# Patient Record
Sex: FEMALE | Race: BLACK OR AFRICAN AMERICAN | NOT HISPANIC OR LATINO | Employment: UNEMPLOYED | URBAN - METROPOLITAN AREA
[De-identification: names, ages, dates, MRNs, and addresses within clinical notes are randomized per-mention and may not be internally consistent; named-entity substitution may affect disease eponyms.]

---

## 2023-08-16 ENCOUNTER — OFFICE VISIT (OUTPATIENT)
Dept: PRIMARY CARE | Facility: CLINIC | Age: 43
End: 2023-08-16
Payer: COMMERCIAL

## 2023-08-16 VITALS
WEIGHT: 207.6 LBS | SYSTOLIC BLOOD PRESSURE: 111 MMHG | HEART RATE: 67 BPM | HEIGHT: 67 IN | TEMPERATURE: 97.8 F | OXYGEN SATURATION: 98 % | DIASTOLIC BLOOD PRESSURE: 73 MMHG | BODY MASS INDEX: 32.58 KG/M2

## 2023-08-16 DIAGNOSIS — Z00.00 HEALTH CARE MAINTENANCE: Primary | ICD-10-CM

## 2023-08-16 PROCEDURE — 1036F TOBACCO NON-USER: CPT | Performed by: FAMILY MEDICINE

## 2023-08-16 PROCEDURE — 99396 PREV VISIT EST AGE 40-64: CPT | Performed by: FAMILY MEDICINE

## 2023-08-16 PROCEDURE — 88175 CYTOPATH C/V AUTO FLUID REDO: CPT

## 2023-08-16 PROCEDURE — 87624 HPV HI-RISK TYP POOLED RSLT: CPT

## 2023-08-16 ASSESSMENT — PAIN SCALES - GENERAL: PAINLEVEL: 0-NO PAIN

## 2023-08-16 NOTE — PROGRESS NOTES
Subjective   Siobhan Adler is a 43 y.o. female and is here for a comprehensive physical exam. The patient reports no problems.    Do you take any herbs or supplements that were not prescribed by a doctor? not asked  Are you taking calcium supplements? no  Are you taking aspirin daily? no      History:  LMP: No LMP recorded. Has regular menses.    Last pap date: > 5 years ago  Abnormal pap? no  : 8  Para: 8    Do you have pain that bothers you in your daily life? no  Review of Systems     Objective   Physical Exam  Constitutional:       Appearance: She is obese.   HENT:      Head: Normocephalic.      Nose: Nose normal.      Mouth/Throat:      Pharynx: Oropharynx is clear.   Eyes:      Pupils: Pupils are equal, round, and reactive to light.   Cardiovascular:      Rate and Rhythm: Normal rate and regular rhythm.   Pulmonary:      Effort: Pulmonary effort is normal.      Breath sounds: Normal breath sounds.   Abdominal:      General: Abdomen is flat.      Tenderness: There is no abdominal tenderness.   Genitourinary:     Exam position: Lithotomy position.      Pubic Area: No rash.       Labia:         Right: No rash.       Vagina: No vaginal discharge.      Comments: Normal appearing cervix. No masses palpated on bimanual examination.  Musculoskeletal:      Cervical back: Normal range of motion.         Assessment/Plan   Healthy female exam. PAP smear, routine cultures done. She has concerns about STDs, but no discharge, itching, pelvic pain or other symptoms.     1. Counseled about diet including having breakfast daily, avoiding caloric beverages, not eating after 7PM and daily exercise.   2. Patient Counseling:  --Nutrition: Stressed importance of moderation in sodium/caffeine intake, saturated fat and cholesterol, caloric balance, sufficient intake of fresh fruits, vegetables, fiber, calcium, iron, and 1 mg of folate supplement per day (for females capable of pregnancy).  --Discussed the issue of estrogen  replacement, calcium supplement, and the daily use of baby aspirin.  --Exercise: Stressed the importance of regular exercise.   --Substance Abuse: Discussed cessation/primary prevention of tobacco, alcohol, or other drug use; driving or other dangerous activities under the influence; availability of treatment for abuse.    --Sexuality: Discussed sexually transmitted diseases, partner selection, use of condoms, avoidance of unintended pregnancy  and contraceptive alternatives.   --Injury prevention: Discussed safety belts, safety helmets, smoke detector, smoking near bedding or upholstery.   --Dental health: Discussed importance of regular tooth brushing, flossing, and dental visits.  --Immunizations reviewed.  --Discussed benefits of screening colonoscopy.  --After hours service discussed with patient  3. Discussed the patient's BMI with her.  The BMI is above average. The patient received Provided instructions on dietary changes because they have an above normal BMI.  4. Follow up as needed for acute illness; Plus dependent upon PAP results.

## 2023-08-18 LAB
CHLAMYDIA TRACH., AMPLIFIED: NEGATIVE
N. GONORRHEA, AMPLIFIED: NEGATIVE
TRICHOMONAS VAGINALIS: NORMAL

## 2023-08-23 LAB
COMPLETE PATHOLOGY REPORT: NORMAL
CONVERTED CLINICAL DIAGNOSIS-HISTORY: NORMAL
CONVERTED DIAGNOSIS COMMENT: NORMAL
CONVERTED FINAL DIAGNOSIS: NORMAL
CONVERTED FINAL REPORT PDF LINK TO COPY AND PASTE: NORMAL

## 2023-11-15 ENCOUNTER — OFFICE VISIT (OUTPATIENT)
Dept: DERMATOLOGY | Facility: CLINIC | Age: 43
End: 2023-11-15
Payer: COMMERCIAL

## 2023-11-15 DIAGNOSIS — B07.0 PLANTAR WART: Primary | ICD-10-CM

## 2023-11-15 PROCEDURE — 99203 OFFICE O/P NEW LOW 30 MIN: CPT | Performed by: STUDENT IN AN ORGANIZED HEALTH CARE EDUCATION/TRAINING PROGRAM

## 2023-11-15 PROCEDURE — 1036F TOBACCO NON-USER: CPT | Performed by: STUDENT IN AN ORGANIZED HEALTH CARE EDUCATION/TRAINING PROGRAM

## 2023-11-15 ASSESSMENT — ITCH NUMERIC RATING SCALE: HOW SEVERE IS YOUR ITCHING?: 0

## 2023-11-15 NOTE — PROGRESS NOTES
Subjective     Siobhan Adler is a 43 y.o. female who presents for the following: Plantar Warts (Patient was referred from podiatrist. On R foot. Patient had site scraped off at podiatrist and has been using medicated bandages. Painful. ).     Review of Systems:  No other skin or systemic complaints other than what is documented elsewhere in the note.    The following portions of the chart were reviewed this encounter and updated as appropriate:          Skin Cancer History  No skin cancer on file.      Specialty Problems    None       Objective   Well appearing patient in no apparent distress; mood and affect are within normal limits.    A focused skin examination was performed. All findings within normal limits unless otherwise noted below.    Assessment/Plan   1. Plantar wart  Right Plantar Surface of Heel  Verrucous papule interrupting skin tension lines      Reviewed viral etiology and transmissible nature of the condition. Reviewed treatment options including LN2 cryotherapy, observation, WartPeel, imiquimod, intralesional therapies, shave removal, and laser treatment.    Patient opted for treatment with LN2 today.       Destr of lesion - Right Plantar Surface of Heel  Complexity: simple    Destruction method: cryotherapy    Informed consent: discussed and consent obtained    Debridement: hyperkeratotic portion removed with sharp debridement    Lesion destroyed using liquid nitrogen: Yes    Cryotherapy cycles:  3  Outcome: patient tolerated procedure well with no complications    Post-procedure details: wound care instructions given        FU 5 weeks

## 2023-12-20 ENCOUNTER — OFFICE VISIT (OUTPATIENT)
Dept: DERMATOLOGY | Facility: CLINIC | Age: 43
End: 2023-12-20
Payer: COMMERCIAL

## 2023-12-20 DIAGNOSIS — B07.8 OTHER VIRAL WARTS: Primary | ICD-10-CM

## 2023-12-20 DIAGNOSIS — B07.0 PLANTAR WART: ICD-10-CM

## 2023-12-20 PROCEDURE — 17110 DESTRUCTION B9 LES UP TO 14: CPT | Performed by: STUDENT IN AN ORGANIZED HEALTH CARE EDUCATION/TRAINING PROGRAM

## 2023-12-20 PROCEDURE — 1036F TOBACCO NON-USER: CPT | Performed by: STUDENT IN AN ORGANIZED HEALTH CARE EDUCATION/TRAINING PROGRAM

## 2023-12-20 ASSESSMENT — DERMATOLOGY QUALITY OF LIFE (QOL) ASSESSMENT
RATE HOW EMOTIONALLY BOTHERED YOU ARE BY YOUR SKIN PROBLEM (FOR EXAMPLE, WORRY, EMBARRASSMENT, FRUSTRATION): 1
RATE HOW BOTHERED YOU ARE BY SYMPTOMS OF YOUR SKIN PROBLEM (EG, ITCHING, STINGING BURNING, HURTING OR SKIN IRRITATION): 1
DATE THE QUALITY-OF-LIFE ASSESSMENT WAS COMPLETED: 66828
RATE HOW BOTHERED YOU ARE BY EFFECTS OF YOUR SKIN PROBLEMS ON YOUR ACTIVITIES (EG, GOING OUT, ACCOMPLISHING WHAT YOU WANT, WORK ACTIVITIES OR YOUR RELATIONSHIPS WITH OTHERS): 0 - NEVER BOTHERED

## 2023-12-20 ASSESSMENT — ITCH NUMERIC RATING SCALE: HOW SEVERE IS YOUR ITCHING?: 0

## 2023-12-20 ASSESSMENT — PATIENT GLOBAL ASSESSMENT (PGA): PATIENT GLOBAL ASSESSMENT: PATIENT GLOBAL ASSESSMENT:  2 - MILD

## 2023-12-20 ASSESSMENT — DERMATOLOGY PATIENT ASSESSMENT
DO YOU HAVE IRREGULAR MENSTRUAL CYCLES: NO
ARE YOU AN ORGAN TRANSPLANT RECIPIENT: NO
HAVE YOU HAD OR DO YOU HAVE VASCULAR DISEASE: NO
DO YOU USE A TANNING BED: NO
ARE YOU TRYING TO GET PREGNANT: NO
ARE YOU ON BIRTH CONTROL: NO
HAVE YOU HAD OR DO YOU HAVE A STAPH INFECTION: NO

## 2023-12-20 NOTE — PROGRESS NOTES
Subjective     Siobhan Adler is a 43 y.o. female who presents for the following: Wart (F/U LN2 right foot).     Review of Systems:  No other skin or systemic complaints other than what is documented elsewhere in the note.    The following portions of the chart were reviewed this encounter and updated as appropriate:          Skin Cancer History  No skin cancer on file.      Specialty Problems    None       Objective   Well appearing patient in no apparent distress; mood and affect are within normal limits.    A focused skin examination was performed. All findings within normal limits unless otherwise noted below.    Assessment/Plan   1. Other viral warts  Right Plantar Surface of Heel  Verrucous papule    Destr of lesion - Right Plantar Surface of Heel  Complexity: simple    Destruction method: cryotherapy    Informed consent: discussed and consent obtained    Debridement: hyperkeratotic portion removed with sharp debridement    Lesion destroyed using liquid nitrogen: Yes    Cryotherapy cycles:  3  Outcome: patient tolerated procedure well with no complications    Post-procedure details: wound care instructions given      2. Plantar wart    Related Procedures  Follow Up In Dermatology - Established Patient

## 2024-02-08 ENCOUNTER — OFFICE VISIT (OUTPATIENT)
Dept: DERMATOLOGY | Facility: CLINIC | Age: 44
End: 2024-02-08
Payer: COMMERCIAL

## 2024-02-08 DIAGNOSIS — B07.8 OTHER VIRAL WARTS: ICD-10-CM

## 2024-02-08 DIAGNOSIS — B07.0 PLANTAR WART: Primary | ICD-10-CM

## 2024-02-08 PROCEDURE — 1036F TOBACCO NON-USER: CPT | Performed by: STUDENT IN AN ORGANIZED HEALTH CARE EDUCATION/TRAINING PROGRAM

## 2024-02-08 PROCEDURE — 17110 DESTRUCTION B9 LES UP TO 14: CPT | Performed by: STUDENT IN AN ORGANIZED HEALTH CARE EDUCATION/TRAINING PROGRAM

## 2024-02-08 NOTE — PROGRESS NOTES
Subjective     Siobhan Adler is a 43 y.o. female who presents for the following: Wart (LV 12/20/23. Right heel slight improvement. Patient still experiencing pain if standing too long.).     Review of Systems:  No other skin or systemic complaints other than what is documented elsewhere in the note.    The following portions of the chart were reviewed this encounter and updated as appropriate:          Skin Cancer History  No skin cancer on file.      Specialty Problems    None       Objective   Well appearing patient in no apparent distress; mood and affect are within normal limits.    A focused skin examination was performed. All findings within normal limits unless otherwise noted below.    Assessment/Plan   1. Plantar wart  Right Plantar Surface of Heel  Verrucous papule interrupting skin tension lines      Destr of lesion - Right Plantar Surface of Heel  Complexity: simple    Destruction method: cryotherapy    Informed consent: discussed and consent obtained    Debridement: hyperkeratotic portion removed with sharp debridement    Lesion destroyed using liquid nitrogen: Yes    Cryotherapy cycles:  3  Outcome: patient tolerated procedure well with no complications    Post-procedure details: wound care instructions given      Related Procedures  Follow Up In Dermatology - Established Patient    2. Other viral warts    Related Procedures  Follow Up In Dermatology - Established Patient

## 2024-03-28 ENCOUNTER — APPOINTMENT (OUTPATIENT)
Dept: DERMATOLOGY | Facility: CLINIC | Age: 44
End: 2024-03-28
Payer: COMMERCIAL

## 2024-07-10 ENCOUNTER — APPOINTMENT (OUTPATIENT)
Dept: DERMATOLOGY | Facility: CLINIC | Age: 44
End: 2024-07-10
Payer: COMMERCIAL

## 2024-07-10 DIAGNOSIS — B07.8 OTHER VIRAL WARTS: Primary | ICD-10-CM

## 2024-07-10 DIAGNOSIS — L85.9 HYPERKERATOSIS: ICD-10-CM

## 2024-07-10 DIAGNOSIS — B07.0 PLANTAR WART: ICD-10-CM

## 2024-07-10 DIAGNOSIS — M72.2 PLANTAR FASCIITIS OF RIGHT FOOT: ICD-10-CM

## 2024-07-10 PROCEDURE — 99213 OFFICE O/P EST LOW 20 MIN: CPT | Performed by: STUDENT IN AN ORGANIZED HEALTH CARE EDUCATION/TRAINING PROGRAM

## 2024-07-10 NOTE — PROGRESS NOTES
Subjective     Siobhan Adler is a 44 y.o. female who presents for the following: Wart (Plantar wart to right heel. LN2 last visit (2/8/24). States she's believes it has resolved, but has pain to that area, unsure if related or not.  ).     Review of Systems:  No other skin or systemic complaints other than what is documented elsewhere in the note.    The following portions of the chart were reviewed this encounter and updated as appropriate:          Skin Cancer History  No skin cancer on file.      Specialty Problems    None       Objective   Well appearing patient in no apparent distress; mood and affect are within normal limits.    A focused skin examination was performed. All findings within normal limits unless otherwise noted below.    Assessment/Plan   1. Other viral warts  Right Plantar Surface of Heel  Clear today, no need for further treatment. May recur    2. Plantar wart    Related Procedures  Follow Up In Dermatology - Established Patient    3. Hyperkeratosis  Scaling and excessive skin of feet  Recommended Amlactin daily    4. Plantar fasciitis of right foot  Right Plantar Surface of Heel  Pain with walking of heel  Discussed diagnosis  Reviewed what can cause flares  Recommend shoe fitting and stretches/exercises for condition  Pain relief with OTC NSAIDS and tylenol prn  Has FU with PCP

## 2024-08-19 ENCOUNTER — APPOINTMENT (OUTPATIENT)
Dept: PRIMARY CARE | Facility: CLINIC | Age: 44
End: 2024-08-19
Payer: COMMERCIAL

## 2024-08-19 ENCOUNTER — LAB (OUTPATIENT)
Dept: LAB | Facility: LAB | Age: 44
End: 2024-08-19
Payer: COMMERCIAL

## 2024-08-19 VITALS
WEIGHT: 205.7 LBS | HEART RATE: 69 BPM | OXYGEN SATURATION: 98 % | TEMPERATURE: 96.9 F | DIASTOLIC BLOOD PRESSURE: 69 MMHG | SYSTOLIC BLOOD PRESSURE: 103 MMHG | HEIGHT: 67 IN | BODY MASS INDEX: 32.28 KG/M2

## 2024-08-19 DIAGNOSIS — Z00.00 HEALTH CARE MAINTENANCE: ICD-10-CM

## 2024-08-19 DIAGNOSIS — R73.9 ELEVATED SERUM GLUCOSE: ICD-10-CM

## 2024-08-19 DIAGNOSIS — Z71.1 CONCERN ABOUT STI IN FEMALE WITHOUT DIAGNOSIS: ICD-10-CM

## 2024-08-19 DIAGNOSIS — Z12.31 ENCOUNTER FOR SCREENING MAMMOGRAM FOR MALIGNANT NEOPLASM OF BREAST: Primary | ICD-10-CM

## 2024-08-19 LAB
ALBUMIN SERPL BCP-MCNC: 4.2 G/DL (ref 3.4–5)
ALP SERPL-CCNC: 63 U/L (ref 33–110)
ALT SERPL W P-5'-P-CCNC: 10 U/L (ref 7–45)
ANION GAP SERPL CALC-SCNC: 11 MMOL/L (ref 10–20)
AST SERPL W P-5'-P-CCNC: 11 U/L (ref 9–39)
BILIRUB SERPL-MCNC: 0.4 MG/DL (ref 0–1.2)
BUN SERPL-MCNC: 13 MG/DL (ref 6–23)
CALCIUM SERPL-MCNC: 9.4 MG/DL (ref 8.6–10.6)
CHLORIDE SERPL-SCNC: 106 MMOL/L (ref 98–107)
CHOLEST SERPL-MCNC: 207 MG/DL (ref 0–199)
CHOLESTEROL/HDL RATIO: 3.5
CO2 SERPL-SCNC: 26 MMOL/L (ref 21–32)
CREAT SERPL-MCNC: 0.67 MG/DL (ref 0.5–1.05)
EGFRCR SERPLBLD CKD-EPI 2021: >90 ML/MIN/1.73M*2
EST. AVERAGE GLUCOSE BLD GHB EST-MCNC: 94 MG/DL
GLUCOSE SERPL-MCNC: 81 MG/DL (ref 74–99)
HBA1C MFR BLD: 4.9 %
HBV SURFACE AG SERPL QL IA: NONREACTIVE
HCV AB SER QL: NONREACTIVE
HDLC SERPL-MCNC: 59.7 MG/DL
HIV 1+2 AB+HIV1 P24 AG SERPL QL IA: NONREACTIVE
LDLC SERPL CALC-MCNC: 136 MG/DL
NON HDL CHOLESTEROL: 147 MG/DL (ref 0–149)
POTASSIUM SERPL-SCNC: 4 MMOL/L (ref 3.5–5.3)
PROT SERPL-MCNC: 7.5 G/DL (ref 6.4–8.2)
SODIUM SERPL-SCNC: 139 MMOL/L (ref 136–145)
TREPONEMA PALLIDUM IGG+IGM AB [PRESENCE] IN SERUM OR PLASMA BY IMMUNOASSAY: NONREACTIVE
TRIGL SERPL-MCNC: 58 MG/DL (ref 0–149)
VLDL: 12 MG/DL (ref 0–40)

## 2024-08-19 PROCEDURE — 87591 N.GONORRHOEAE DNA AMP PROB: CPT

## 2024-08-19 PROCEDURE — 83036 HEMOGLOBIN GLYCOSYLATED A1C: CPT

## 2024-08-19 PROCEDURE — 3008F BODY MASS INDEX DOCD: CPT | Performed by: PODIATRIST

## 2024-08-19 PROCEDURE — 86803 HEPATITIS C AB TEST: CPT

## 2024-08-19 PROCEDURE — 80053 COMPREHEN METABOLIC PANEL: CPT

## 2024-08-19 PROCEDURE — 80061 LIPID PANEL: CPT

## 2024-08-19 PROCEDURE — 99396 PREV VISIT EST AGE 40-64: CPT | Performed by: PODIATRIST

## 2024-08-19 PROCEDURE — 36415 COLL VENOUS BLD VENIPUNCTURE: CPT

## 2024-08-19 PROCEDURE — 87661 TRICHOMONAS VAGINALIS AMPLIF: CPT

## 2024-08-19 PROCEDURE — 86780 TREPONEMA PALLIDUM: CPT

## 2024-08-19 PROCEDURE — 87491 CHLMYD TRACH DNA AMP PROBE: CPT

## 2024-08-19 PROCEDURE — 87340 HEPATITIS B SURFACE AG IA: CPT

## 2024-08-19 PROCEDURE — 87389 HIV-1 AG W/HIV-1&-2 AB AG IA: CPT

## 2024-08-19 ASSESSMENT — PAIN SCALES - GENERAL: PAINLEVEL: 0-NO PAIN

## 2024-08-19 ASSESSMENT — ENCOUNTER SYMPTOMS
DEPRESSION: 0
LOSS OF SENSATION IN FEET: 0
OCCASIONAL FEELINGS OF UNSTEADINESS: 0

## 2024-08-19 ASSESSMENT — COLUMBIA-SUICIDE SEVERITY RATING SCALE - C-SSRS
2. HAVE YOU ACTUALLY HAD ANY THOUGHTS OF KILLING YOURSELF?: NO
6. HAVE YOU EVER DONE ANYTHING, STARTED TO DO ANYTHING, OR PREPARED TO DO ANYTHING TO END YOUR LIFE?: NO
1. IN THE PAST MONTH, HAVE YOU WISHED YOU WERE DEAD OR WISHED YOU COULD GO TO SLEEP AND NOT WAKE UP?: NO

## 2024-08-19 ASSESSMENT — PATIENT HEALTH QUESTIONNAIRE - PHQ9
2. FEELING DOWN, DEPRESSED OR HOPELESS: NOT AT ALL
SUM OF ALL RESPONSES TO PHQ9 QUESTIONS 1 AND 2: 0
1. LITTLE INTEREST OR PLEASURE IN DOING THINGS: NOT AT ALL

## 2024-08-19 NOTE — PATIENT INSTRUCTIONS
Thank you for coming in to see us today, Ms. Siobhan Adler! It was a pleasure managing your health together.    Today in clinic, we discussed yearly physical and blood work.    If we ordered bloodwork today, you can get this done at ANY  location and the results will come to me directly. The [facility] here at [facility] are walk-in (no appointment needed); [facility] lab's hours are M-F 6:30a-6p and Sat 8a-12p.    Please call  [phone] to make your appointment.     If you have any questions/concerns, you can always use [employer] to message me directly. Or if you prefer, you can call the office at [phone]; if you leave a message, the office staff should translate this to a message in my inbox.    I'm looking forward to seeing you back in clinic in 1 year to discuss yearly physical.    Best,  Dr. Leon

## 2024-08-19 NOTE — PROGRESS NOTES
Subjective   Patient ID: Siobhan Adler is a 44 y.o. female with no PMH who presents for Establish Care (Physical ).      Additional concerns: Concern for STI  -Currently sexual active, uses condoms. Declines birth control. Would like to be checked for all STI. No change in discharge. No odor. No pain.     Medications:  None    PMH:  HSV    Surgical Hx:  None    # Health Maintenance  - Last prior HM: one years ago  - Patient's rating of their own health: Good  - Dental Care: last prior dental visit Has dentist; last seen over 6 months ago // brushes teeth  // flosses teeth  // denies current tooth pain  - Vision: last prior ophtho visit a few years ago  // corrective devices:  // recent vision: no concerns   - Hearing: denies recent hearing loss   - Diet: Pretty good; well balanced   Food Insecurity: Not on File (3/29/2022)    Received from [facility]    Food Insecurity     Food: 0     - Exercise: walking at work; 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 Every once in a while glass of wine   Alcohol Use: Not on file     - Illicit substances: denied.  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 No  - Last mammogram: Last mammogram 22     WOMEN  - Menstrual Status: Regular menses  - Pregnancy history:   - Bladder dysfunction? has not had any episodes of incontinence  -  "Cervical CA: last prior pap 8/2023 // h/o abnormal pap? Yes, describe: in the past with colposcopy   - Breast CA: last prior mammo  12/27/22 // h/o abnormal mammo? No      FamHx:  No family history on file.      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    Meds:  No current outpatient medications on file.     No current facility-administered medications for this visit.        Patient care team:  Patient Care Team:  Danielle Florence MD as PCP - General  Keny Lopez MD as PCP - [facility] PCP  Keny Lopez MD as PCP - [facility] PCP     Objective     Vitals: /69 (BP Location: Right arm, Patient Position: Sitting, BP Cuff Size: Adult)   Pulse 69   Temp 36.1 °C (96.9 °F) (Temporal)   Ht 1.702 m (5' 7\")   Wt 93.3 kg (205 lb 11.2 oz)   SpO2 98%   BMI 32.22 kg/m²    Physical Exam  Vitals and nursing note reviewed.   Constitutional:       General: She is not in acute distress.     Appearance: Normal appearance.   HENT:      Head: Normocephalic and atraumatic.      Nose: Nose normal.      Mouth/Throat:      Mouth: Mucous membranes are moist.   Eyes:      Extraocular Movements: Extraocular movements intact.      Conjunctiva/sclera: Conjunctivae normal.   Cardiovascular:      Rate and Rhythm: Normal rate and regular rhythm.      Heart sounds: Normal heart sounds. "   Pulmonary:      Effort: Pulmonary effort is normal.      Breath sounds: Normal breath sounds.   Abdominal:      General: Bowel sounds are normal. There is no distension.      Palpations: Abdomen is soft.      Tenderness: There is no abdominal tenderness.   Musculoskeletal:         General: Normal range of motion.      Cervical back: Normal range of motion.   Skin:     General: Skin is warm.   Neurological:      General: No focal deficit present.      Mental Status: She is alert and oriented to person, place, and time. Mental status is at baseline.   Psychiatric:         Mood and Affect: Mood normal.         Behavior: Behavior normal.         Thought Content: Thought content normal.         Judgment: Judgment normal.         PHQ2:  Over the past 2 weeks, how often have you been bothered by any of the following problems?  Little interest or pleasure in doing things: Not at all  Feeling down, depressed, or hopeless: Not at all  Patient Health Questionnaire-2 Score: 0    Assessment/Plan   Siobhan Adler is a 44 y.o. female with no PMH who presents for Establish Care (Physical ). Plan as follows:    # Routine Health Maintenance  - Flu vaccine: recommended annually  - COVID vaccine: recommended completion of primary series and recommended boosters  - Prevnar (PCV20): N/A  - Tdap: next due 2029  - HPV (<45): N/A  - Shingrix (50+): N/A  - RSV vaccination: Recommended   - STI screen: ordered GC, chlamydia, trich, syphilis, HIV, HepC  - Lifetime HIV, HepC: negative; ordered today  - Lipid Panel (35M,45F): Last 8/29/22; ordered today   - DM screening: Ordered today   - HTN screening: wnl today  - Food Insecurity screen: None  - Depression: PHQ-2 0   - Tobacco Cessation: N/A  - Last Dental: recommended follow up every 6mo   - Last Eye exam: recommended follow up annually   - Colon CA screening (45-75): N/A   - Lung CA screening (50-80, >20py): discussed importance of continued surveillance rather than single exam, discussed  r/b/a, with shared decision making patient   - Pap smear (21-65F): Last 2023  - Breast CA screening (40-74F): Mammogram ordered today   - Osteoporosis (65+F): N/A    Problem List Items Addressed This Visit    None  Visit Diagnoses       Encounter for screening mammogram for malignant neoplasm of breast    -  Primary    Relevant Orders    BI mammo bilateral screening tomosynthesis    Concern about STI in female without diagnosis        Relevant Orders    C. trachomatis / N. gonorrhoeae, DNA probe (Completed)    Trichomonas vaginalis, Amplified (Completed)    HIV 1/2 Antigen/Antibody Screen with Reflex to Confirmation (Completed)    Syphilis Screen with Reflex (Completed)    Hepatitis B surface antigen (Completed)    Hepatitis C antibody (Completed)    Health care maintenance        Relevant Orders    C. trachomatis / N. gonorrhoeae, DNA probe (Completed)    Trichomonas vaginalis, Amplified (Completed)    HIV 1/2 Antigen/Antibody Screen with Reflex to Confirmation (Completed)    Syphilis Screen with Reflex (Completed)    Hepatitis B surface antigen (Completed)    Lipid panel (Completed)    Hemoglobin A1c (Completed)    Comprehensive metabolic panel (Completed)    Hepatitis C antibody (Completed)    BMI 32.0-32.9,adult        Relevant Orders    Lipid panel (Completed)    Hemoglobin A1c (Completed)    Comprehensive metabolic panel (Completed)    Elevated serum glucose        Relevant Orders    Lipid panel (Completed)    Hemoglobin A1c (Completed)    Comprehensive metabolic panel (Completed)          Diagnoses and all orders for this visit:  Encounter for screening mammogram for malignant neoplasm of breast  -     BI mammo bilateral screening tomosynthesis; Future  Concern about STI in female without diagnosis  -     C. trachomatis / N. gonorrhoeae, DNA probe; Future  -     Trichomonas vaginalis, Amplified; Future  -     HIV 1/2 Antigen/Antibody Screen with Reflex to Confirmation; Future  -     Syphilis Screen with  Reflex; Future  -     Hepatitis B surface antigen; Future  -     Hepatitis C antibody; Future  -     Declined yeast/ BV swab  Health care maintenance  -     C. trachomatis / N. gonorrhoeae, DNA probe; Future  -     Trichomonas vaginalis, Amplified; Future  -     HIV 1/2 Antigen/Antibody Screen with Reflex to Confirmation; Future  -     Syphilis Screen with Reflex; Future  -     Hepatitis B surface antigen; Future  -     Lipid panel; Future  -     Hemoglobin A1c; Future  -     Comprehensive metabolic panel; Future  -     Hepatitis C antibody; Future  BMI 32.0-32.9,adult  -     Lipid panel; Future  -     Hemoglobin A1c; Future  -     Comprehensive metabolic panel; Future  Elevated serum glucose  -     Lipid panel; Future  -     Hemoglobin A1c; Future  -     Comprehensive metabolic panel; Future   Return to clinic        -      in 1 year for annual physical, or earlier as needed    Patient discussed with attending physician, Dr. Chavez.     Marjorie Leon MD   PGY2, Family Medicine

## 2024-08-21 LAB
C TRACH RRNA SPEC QL NAA+PROBE: NEGATIVE
N GONORRHOEA DNA SPEC QL PROBE+SIG AMP: NEGATIVE
T VAGINALIS RRNA SPEC QL NAA+PROBE: NEGATIVE

## 2024-08-23 NOTE — PROGRESS NOTES
I reviewed the resident/fellow's documentation and discussed the patient with the resident/fellow. I agree with the resident/fellow's medical decision making as documented in the note.   Health maintenance visit.  BP low-normal.  Continue to encourage healthy diet and exercise to improve BMI.    Jami Chavez MD

## 2024-09-05 ENCOUNTER — HOSPITAL ENCOUNTER (OUTPATIENT)
Dept: RADIOLOGY | Facility: HOSPITAL | Age: 44
Discharge: HOME | End: 2024-09-05
Payer: COMMERCIAL

## 2024-09-05 VITALS — BODY MASS INDEX: 30.61 KG/M2 | WEIGHT: 195 LBS | HEIGHT: 67 IN

## 2024-09-05 DIAGNOSIS — Z12.31 ENCOUNTER FOR SCREENING MAMMOGRAM FOR MALIGNANT NEOPLASM OF BREAST: ICD-10-CM

## 2024-09-05 PROCEDURE — 77067 SCR MAMMO BI INCL CAD: CPT

## 2025-05-23 ENCOUNTER — OFFICE VISIT (OUTPATIENT)
Facility: HOSPITAL | Age: 45
End: 2025-05-23
Payer: COMMERCIAL

## 2025-05-23 ENCOUNTER — HOSPITAL ENCOUNTER (OUTPATIENT)
Dept: RADIOLOGY | Facility: HOSPITAL | Age: 45
Discharge: HOME | End: 2025-05-23
Payer: COMMERCIAL

## 2025-05-23 VITALS
DIASTOLIC BLOOD PRESSURE: 85 MMHG | HEART RATE: 89 BPM | BODY MASS INDEX: 34.84 KG/M2 | SYSTOLIC BLOOD PRESSURE: 124 MMHG | TEMPERATURE: 97.4 F | RESPIRATION RATE: 16 BRPM | HEIGHT: 67 IN | WEIGHT: 222 LBS | OXYGEN SATURATION: 98 %
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
## 2025-05-23 PROCEDURE — 73030 X-RAY EXAM OF SHOULDER: CPT | Mod: LT

## 2025-05-23 PROCEDURE — 73030 X-RAY EXAM OF SHOULDER: CPT | Mod: LEFT SIDE | Performed by: STUDENT IN AN ORGANIZED HEALTH CARE EDUCATION/TRAINING PROGRAM

## 2025-05-23 PROCEDURE — 99213 OFFICE O/P EST LOW 20 MIN: CPT | Mod: GE

## 2025-05-23 RX ORDER — NAPROXEN 500 MG/1
500 TABLET ORAL 2 TIMES DAILY PRN
Qty: 60 TABLET | Refills: 0 | Status: SHIPPED | OUTPATIENT
Start: 2025-05-23 | End: 2025-08-21

## 2025-05-23 ASSESSMENT — PAIN SCALES - GENERAL: PAINLEVEL_OUTOF10: 2

## 2025-05-23 NOTE — PROGRESS NOTES
I reviewed the resident/fellow's documentation and discussed the patient with the resident/fellow. I agree with the resident/fellow's medical decision making as documented in the note.    Gabriel Haywood MD
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
